# Patient Record
Sex: FEMALE | Race: WHITE | Employment: FULL TIME | URBAN - METROPOLITAN AREA
[De-identification: names, ages, dates, MRNs, and addresses within clinical notes are randomized per-mention and may not be internally consistent; named-entity substitution may affect disease eponyms.]

---

## 2022-12-21 NOTE — PROGRESS NOTES
[facility]  [address] [facility] [address], [address] [place] [address] Court, [address] [facility]  [phone]    Patient Name:  Winston Paris  YOB: 1985      Office Visit 12/23/2022    CHIEF COMPLAINT:    Chief Complaint   Patient presents with    New Patient    Sleep Apnea    Sleep Study       HISTORY OF PRESENT ILLNESS:      The patient present in outpatient consultation at the request of Jones APRN-NP for evaluation and management of obstructive sleep apnea. The patient underwent a recent screening HST because of symptoms including daytime fatigue, nonrestorative sleep, difficulty maintaining sleep. Additional symptoms include grinding her teeth, difficulty staying asleep. There is no history of cataplexy, hypnagogic hallucinations, or sleep paralysis. In addition, there is no history of frequent leg movements, kicking at night, or an inability to keep the legs still. He indicated that she go to sleep around 12 midnight and wake up around 5:55 AM daily. In the week and she sleep a little later and she wake up between 8 and 9 AM.  It takes about 45 minutes to fall asleep and she has on average 2 awakening every night. She does not feel refreshed upon awakening. She indicated that her father has sleep apnea and he is supposed to be on CPAP machine but he does not use it     The screening HST was notable for a respiratory disturbance index of 5.7/hour. Oxygen desaturations are low as 83% were noted. Significant cardiac arrhythmias were not evident. I discussed with the patient the pathophysiology of sleep apnea and reviewed with her different treatment option including trial of CPAP therapy, oral appliance, positional therapy. Furthermore I suggested to her that we need to confirm the diagnosis of sleep apnea and assess the severity by doing another evaluation. She preferred to repeat the home sleep study.   Once that done we will proceed with oral appliance evaluation since she has history of bruxism. The [place] score today was 3 out of 24. Sleepiness Scale:     Sitting and reading 0    Watching TV 0    Sitting inactive in a public place 0    As a passenger in a car for an hour without a break 1    Lying down to rest in the afternoon when circumstances Permits 1    Sitting and talking to someone 0    Sitting quietly after lunch without alcohol 1    In a car, while stopped for a few minutes 0    Total :  3/24    History reviewed. No pertinent past medical history. Patient Active Problem List   Diagnosis    KATI (obstructive sleep apnea)    Sleep related bruxism    Non-restorative sleep    Nocturnal hypoxemia    BMI 36.0-36.9,adult       History reviewed. No pertinent surgical history. [unfilled]    Social History     Socioeconomic History    Marital status: Single     Spouse name: Not on file    Number of children: Not on file    Years of education: Not on file    Highest education level: Not on file   Occupational History    Not on file   Tobacco Use    Smoking status: Never    Smokeless tobacco: Never   Substance and Sexual Activity    Alcohol use: Not on file    Drug use: Not on file    Sexual activity: Not on file   Other Topics Concern    Not on file   Social History Narrative    Not on file     Social Determinants of Health     Financial Resource Strain: Not on file   Food Insecurity: Not on file   Transportation Needs: Not on file   Physical Activity: Not on file   Stress: Not on file   Social Connections: Not on file   Intimate Partner Violence: Not on file   Housing Stability: Not on file         History reviewed. No pertinent family history. No Known Allergies      Current Outpatient Medications   Medication Sig    loratadine (CLARITIN) 10 MG capsule Take 10 mg by mouth daily     No current facility-administered medications for this visit.            REVIEW OF SYSTEMS:   CONSTITUTIONAL:   There is no history of fever, chills, night sweats, weight loss, weight gain, persistent fatigue, or lethargy/hypersomnolence. EYES:   Denies problems with eye pain, erythema, blurred vision, or visual field loss. ENTM:   Denies history of tinnitus, epistaxis, sore throat, hoarseness, or dysphonia. LYMPH:   Denies swollen glands. CARDIAC:   No chest pain, pressure, discomfort, palpitations, orthopnea, murmurs, or edema. GI:   No dysphagia, heartburn reflux, nausea/vomiting, diarrhea, abdominal pain, or bleeding. :   Denies history of dysuria, hematuria, polyuria, or decreased urine output. MS:   No history of myalgias, arthralgias, bone pain, or muscle cramps. SKIN:   No history of rashes, jaundice, cyanosis, nodules, or ulcers. ENDO:   Negative for heat or cold intolerance. No history of DM. PSYCH:   Negative for anxiety, depression, insomnia, hallucinations. NEURO:   There is no history of AMS, persistent headache, decreased level of consciousness, seizures, or motor or sensory deficits. PHYSICAL EXAM:    Vitals:    12/23/22 1017   BP: 126/84   Pulse: 80   Resp: 14   Temp: 98.2 °F (36.8 °C)   SpO2: 98%        GENERAL APPEARANCE:   The patient is normal weight and in no respiratory distress. HEENT:   PERRL. Conjunctivae unremarkable. Nasal mucosa is without epistaxis, exudate, or polyps. Gums and dentition are unremarkable. There is moderately severe oropharyngeal narrowing. She is Mallampati 3   NECK/LYMPHATIC:   Symmetrical with no elevation of jugular venous pulsation. Trachea midline. No thyroid enlargement. No cervical adenopathy. LUNGS:   Normal respiratory effort with symmetrical lung expansion. Breath sounds are diminished in the bases. HEART:   There is a regular rate and rhythm. No murmur, rub, or gallop. There is no edema in the lower extremities. ABDOMEN:   Soft and non-tender. No hepatosplenomegaly. Bowel sounds are normal.     SKIN:   There are no rashes, cyanosis, jaundice, or ecchymosis present.    EXTREMITIES:   The extremities are unremarkable without clubbing, cyanosis, joint inflammation, degenerative, or ischemic change. MUSCULOSKELETAL:   There is no abnormal tone, muscle atrophy, or abnormal movement present. NEURO:   The patient is alert and oriented to person, place, and time. Memory appears intact and mood is normal.  No gross sensorimotor deficits are present. DIAGNOSTIC TESTS:  HST 9/21/22               ASSESSMENT:  (Medical Decision Making)         ICD-10-CM    1. KATI (obstructive sleep apnea) , mild and need further treatment    The pathophysiology of obstructive sleep apnea was reviewed with the patient. It's potential to promote severe neurologic, cardiac, pulmonary, and gastrointestinal problems was discussed. Specifically, the increased incidence of hypertension, coronary artery disease, congestive heart failure, pulmonary hypertension, gastroesophageal reflux, pathologic hypersomnolence, memory loss, and glucose intolerance was related to the consequences of hypoxemia, hypercapnia, airway obstruction, and sympathetic overdrive. We also discussed the ability of nasal CPAP to correct these abnormalities through maintenance of a patent airway. Therapeutic options including surgery, oral appliances were also reviewed. G47.33 Ambulatory Referral to Sleep Studies     External Referral To Dentistry      2. Sleep related bruxism , likely aggravated by sleep apnea G47.63 External Referral To Dentistry      3. Non-restorative sleep , related to poor quality sleep G47.8 External Referral To Dentistry      4. Nocturnal hypoxemia , related to sleep apnea G47.34       5.  BMI 36.0-36.9,adult , contributing to complexity of care Z68.36              PLAN:    The patient will be scheduled for repeat home sleep study to determine the severity of sleep apnea and confirm the diagnosis    Pathophysiology of sleep apnea and different treatment option were discussed with the patient in detail    Proper sleep hygiene and positional therapy are recommended    The patient will be sent for oral appliance evaluation based on history of bruxism and mild sleep apnea    Appropriate handout regarding sleep hygiene and sleep education are provided to the patient    Maintain follow-up with a primary care provider    He will return to the sleep center in 4 months or sooner if needed    All questions and concerns were addressed properly      Orders Placed This Encounter   Procedures    Ambulatory Referral to Sleep Studies     Referral Priority:   Urgent     Referral Type:   Consult for Advice and Opinion     Referral Reason:   Specialty Services Required     Number of Visits Requested:   1    External Referral To Dentistry     Referral Priority:   Routine     Referral Type:   Eval and Treat     Referral Reason:   Specialty Services Required     Requested Specialty:   Dentistry     Number of Visits Requested:   1               No orders of the defined types were placed in this encounter. Over 50% of today's office visit was spent in face to face time reviewing test results, prognosis, importance of compliance, education about disease process, benefits of medications, instructions for management of acute flare-ups, and follow up plans. Total face to face time spent with patient and charting was 60 minutes.     Leander Rinaldi MD  Electronically signed

## 2022-12-23 ENCOUNTER — OFFICE VISIT (OUTPATIENT)
Dept: SLEEP MEDICINE | Age: 37
End: 2022-12-23
Payer: COMMERCIAL

## 2022-12-23 ENCOUNTER — CLINICAL DOCUMENTATION (OUTPATIENT)
Dept: SLEEP MEDICINE | Age: 37
End: 2022-12-23

## 2022-12-23 VITALS
TEMPERATURE: 98.2 F | WEIGHT: 187 LBS | SYSTOLIC BLOOD PRESSURE: 126 MMHG | RESPIRATION RATE: 14 BRPM | DIASTOLIC BLOOD PRESSURE: 84 MMHG | OXYGEN SATURATION: 98 % | BODY MASS INDEX: 36.71 KG/M2 | HEIGHT: 60 IN | HEART RATE: 80 BPM

## 2022-12-23 DIAGNOSIS — G47.34 NOCTURNAL HYPOXEMIA: ICD-10-CM

## 2022-12-23 DIAGNOSIS — G47.33 OSA (OBSTRUCTIVE SLEEP APNEA): ICD-10-CM

## 2022-12-23 DIAGNOSIS — G47.63 SLEEP RELATED BRUXISM: ICD-10-CM

## 2022-12-23 DIAGNOSIS — G47.8 NON-RESTORATIVE SLEEP: ICD-10-CM

## 2022-12-23 PROCEDURE — 99244 OFF/OP CNSLTJ NEW/EST MOD 40: CPT | Performed by: INTERNAL MEDICINE

## 2022-12-23 RX ORDER — LORATADINE 10 MG/1
10 CAPSULE, LIQUID FILLED ORAL DAILY
COMMUNITY

## 2022-12-23 NOTE — PATIENT INSTRUCTIONS
What is a Home Sleep Test?    A home sleep apnea test is just as it sounds; a sleep test your patients can take from their own home to determine if they have sleep apnea. Your patient may come into the sleep center to  equipment for their home sleep test. Orin Lee demonstrate to them how to perform the test, including how to use the machine and equipment properly. The patient goes home and performs the test.  The data obtained is stored on the equipment and is uploaded at the sleep center the following day. Home sleep tests are designed to provide your patients with a convenient way to get the test done while allowing you to collect important information on how they sleep and make a diagnosis of KATI. A home sleep test comes with devices that your patient can easily apply themselves and includes:    A belt that measures respiratory effort and is placed around the upper chest.    A small nasal cannula that measures airflow    A finger clip that measures blood oxygen saturation      What is the process and what happens during a Home Sleep Test?    On the day your patient plans on taking the test, they should:    Avoid napping    Follow their normal routine as much as they can. Avoid caffeine after they eat lunch. They should let you know if they're taking regular medication, since the physician may want to have them discontinue it temporarily, depending on what it is. Before the patient's home sleep test, they'll come by sleep clinic to  the equipment. They can also opt to have hit delivered to their home. Provide them with instructions on how to use the home test and equipment and be ready for any questions they may have on things they don't understand. Let them know they can go to bed at their regular time. When they're ready to go to sleep, they'll attach the sensors to their body like you instructed.   They may need to press a button on the device when they're getting into bed, and most patients are asked to keep a sleep log. The sensors typically include a sensor that slips over the patient's index finger to monitor their oxygen levels and heart rate, and another set of sensors that monitors their breathing patterns. Your patient straps these sensors on their chest and places a small tube similar to an oxygen cannula in their nostrils to measure airflow through their nose. When your patient wakes up the following morning, they'll remove the sensors. They either return the device back through the mail or bring it to the sleep clinic. Sleep Hygiene Instructions    Sleep only as much as you need to feel refreshed during the following day. Restricting your time in bed helps to consolidate and deepen your sleep. Excessively long times in bed lead to fragmented and shallow sleep. Get up at your regular time the next day, no matter how little your slept. Get up at the same time each day, 7 days a week. A regular wake time in the morning leads to regular times on sleep onset, and helps to set your biological clock. Exercise regularly. Schedule exercise times so that they do not occur within 3 hours of when you intend to go to bed. Exercise makes it easier to initiate sleep and deepen sleep. Don't take your problems to bed. Plan some time earlier in the evening for working on your problems or planning the next day's activities. Worrying may interfere with initiating sleep and produce shallow sleep. Train yourself to use the bedroom only for sleep and sexual activity. This will help condition your brain to see bed as the place for sleeping. Do not read, watch TV or eat in bed. Do not try and fall asleep. This only makes the problem worse. Instead, turn on the light, leave the bedroom, and do something different like reading a book. Don't engage in stimulating activity. Return to bed only when you feel sleepy. Avoid long naps.  Staying awake during the day helps to fall asleep at night. Naps totalling more than 30 minutes increase your chances of having trouble sleeping at night. Make sure that your bedroom is comfortable and free from light and noise. A comfortable, noise-free sleep environment will reduce the likelihood that you will wake up during the night. Noise that does not awaken you may disturb the quality of your sleep. Carpeting, insulated curtains, and closing the door may help. Make sure that your bedroom is at a comfortable temperature during the night. Excessively warm or cold sleep environments may disturb sleep. Eat regular meals and di not go to bed hungry. Hunger may disturb sleep. A light snack at bedtime (especially carbohydrates) may help sleep, but avoid greasy or heavy foods. Avoid excessive liquids in the evening. Reducing liquid intake will minimize the need for night-time trips to the bathroom. Cut down on all caffeine products. Caffeinated beverages and food (Coffee, tea, cola, chocolate) can cause difficulty falling asleep, awakenings during the night, and shallow sleep. Even caffeine early in the day can disrupt night-time sleep. Avoid alcohol, especially in the evening. Although alcohol helps tense people fall asleep more easily, it causes awakenings later in the night. Smoking may disturb sleep. Nicotine is a stimulant. Try not to smoke during the night when you have trouble sleeping. Learning about Sleeping Well    What does sleeping well mean? Sleeping well means getting enough sleep. How much sleep is enough varies among people. The number of hours you sleep is not as improtant as how you feel when you wake up. If you do not feel refreshed, you probably need more sleep. Another sign of not getting enough sleep is feeling tired during the day. The average totally nightly sleep time is 7 1/2 to 8 hours. Healthy adults may need a little more or a little less than this.     Why is getting enough sleep important? Getting enough quality sleep is a basic part of good health. When your sleep suffers, your mood and your thoughts can suffer too. Your thoughts can suffer too. You ma find yourself feeling more grumpy or stressed. No getting enough sleep also can lead to serious problems, including injury, accidents, anxiety, and depression. What might cause poor sleeping? Many things can cause sleep problems, including:    Stress. Stress can be caused by fear about a single event, such as giving a speech. Or you may have ongoing stress, such as worry about work or school. Depression, anxiety, and other mental or emotional conditions. Changes in your sleep habits or surroundings. This includes changes that happen where you sleep, such as noise, light, or sleeping in a different bed. It also includes changes in your sleep pattern, such as having jet lab or working a late shift. Health problems, such as pain, breathing problems, and restless legs syndrome. Lack of regular exercise. How can you help yourself? Here are some tips that may help you sleep more soundly and wake up feeling more refreshed. Your sleeping area    Use your bedroom only for sleeping and sex. A bit of light reading may help you fall asleep. But if it doesn't, do your reading elsewhere in the house. Don't watch TV in bed. Be sure your bed is big enough to stretch out comfortable, especially if you have a sleep partner. Keep your bedroom quiet, dark, and cool. Use curtains, blinds, or sleep mask to block out light. To block out noise, use earplugs, soothing music, or a \"white noise\" machine. Your evening and bedtime routine    Create a relaxing bedtime routine. You might want to take a warm shower or bath, listen to soothing music, or drink a cup of non-caffeinated tea. Go to bed at the same time every night. And get up at the same time every morning, even if you feel tired.     What to avoid:    Limit caffeine (coffee, tea, caffeinated sodas) during the day, and dont have any for at least 4 to 6 hours before bedtime. Don't drink alcohol before bedtime. Alcohol can cause you to wake up more often during the night. Don't smoke or use tobacco, especially in the evening. Nicotine can keep you awake. Don't like in bed away for too long. If you can't fall asleep, or if you wake up in the middle of the night and can't get back to sleep within 15 minutes or so, get out of bed and go to another room until you feel sleepy. Don't take medicine right before bed that may keep you awake or make you feel hyper or enegerized. Your doctor can tell you if your medicine may do this and if you can take it earlier in the day. If you can't sleep:    Imagine yourself in a peaceful, pleasant scene. Focus on the details and feelings of being in a place that is relaxing. Get up and do a quiet or boring activity until you feel sleepy. Don't drink liquids after 6 p.m. if you wake up often because you have to go to the bathroom. Where can you learn more? Go to http://www.gray.com/    Enter B037 in the search box to learn more about \"Learning About Sleeping Well. \"

## 2023-02-14 ENCOUNTER — HOSPITAL ENCOUNTER (OUTPATIENT)
Dept: SLEEP CENTER | Age: 38
Discharge: HOME | End: 2023-02-17
Payer: COMMERCIAL

## 2023-02-14 PROCEDURE — 95806 SLEEP STUDY UNATT&RESP EFFT: CPT
